# Patient Record
Sex: MALE | Race: WHITE | NOT HISPANIC OR LATINO | Employment: OTHER | URBAN - METROPOLITAN AREA
[De-identification: names, ages, dates, MRNs, and addresses within clinical notes are randomized per-mention and may not be internally consistent; named-entity substitution may affect disease eponyms.]

---

## 2017-02-13 ENCOUNTER — OFFICE VISIT (OUTPATIENT)
Dept: RETAIL CLINIC | Facility: CLINIC | Age: 57
End: 2017-02-13

## 2017-02-13 DIAGNOSIS — Z11.1 VISIT FOR TB SKIN TEST: Primary | ICD-10-CM

## 2017-02-13 PROCEDURE — 86580 TB INTRADERMAL TEST: CPT | Performed by: NURSE PRACTITIONER

## 2017-02-14 NOTE — PROGRESS NOTES
CC:Presents for Tb screening.     S: Has never had a positive test for Tb or been infected with Tb.  Denies symptoms of active Tb and risk factors for acquiring latent or active Tb:  Has not had a cough> 3 weeks, hemoptysis, unexplained fever, unexplained weight loss, fatigue, night sweats, or change in appetite.  s not a high risk contact of person known or suspected of having Tb.  Has not been to another country for 3 or more months where Tb is common.  Has been in the [place] for > 5 years  Is not a resident or employee of high Tb risk congregate setting.  Is not a health care worker who serves high-risk patients.  Is not medically underserved.  Has not been homeless in past 2 years.  Does not inject illicit drugs or use crack cocaine.  Is not HIV positive, or considered at risk for HIV if status is unknown.   Is not imunosuppressed or on immunosuppressive therapy.  Is not malnourished or >10% below ideal body weight.    O: Appears well today. Respirations are even & unlabored. Lungs are CTA bilaterally.    A: ppd given today as directed. Pt to return to clinic in 48-72 hours for reading. Tolerated well.     P: [place] Department for Public Health Report of Health Tuberculosis Screening form completed and provided to patient. See scanned copy.     SABRINA Yanes

## 2017-02-14 NOTE — PATIENT INSTRUCTIONS
Tuberculin Skin Test  WHY AM I HAVING THIS TEST?  Tuberculosis (TB) is a bacterial infection caused by Mycobacterium tuberculosis. Most people who are exposed to these bacteria have a strong enough defense (immune) system to prevent the bacteria from causing TB and developing symptoms. Their bodies prevent the germs from being active and making them sick (latent TB infection).   However, if you have TB germs in your body and your immune system is weak, you can develop a TB infection. This can cause symptoms such as:   · Night sweats.  · Fever.  · Weakness.  · Weight loss.  A latent TB infection can also become active later in life if your immune system becomes weakened or compromised.  You may have this test if your health care provider suspects that you have TB. You may also have this test to screen for TB if you are at risk for getting the disease. Those at increased risk include:  · People who inject illegal drugs or share needles.  · People with HIV or other diseases that affect immunity.  · Health care workers.  · People who live in high-risk communities, such as homeless shelters, nursing homes, and correctional facilities.  · People who have been in contact with someone with TB.  · People from countries where TB is more common.  If you are in a high-risk group, your health care provider may wish to screen for TB more often. This can help prevent the spread of the disease. Sometimes TB screening is required when starting a new job, such as becoming a health care worker or a teacher. Colleges or universities may require it of new students.  HOW WILL I BE TESTED?  A tuberculin skin test is the main test used to check for exposure to the bacteria that can cause TB. The test checks for antibodies to the bacteria. Antibodies are proteins that your body produces to protect you from germs and other things that can make you sick.  Your health care provider will inject a solution known as PPD (purified protein  derivative) under the first layer of skin on your arm. This causes a blister-like bubble to form at the site. Your health care provider will then examine the site after a number of hours have passed to see if a reaction has occurred.  HOW DO I PREPARE FOR THE TEST?  There is no preparation required for this test.  WHAT DO THE RESULTS MEAN?  Your test results will be reported as either negative or positive.   If the tuberculin skin test produces a negative result, it is likely that you do not have TB and have not been exposed to the TB bacteria.  If you or your health care provider suspects exposure, however, you may want to repeat the test a few weeks later. A blood test may also be used to check for TB. This is because you will not react to the tuberculin skin test until several weeks after exposure to TB bacteria.  If you test positive to the tuberculin skin test, it is likely that you have been exposed to TB bacteria. The test does not distinguish between an active and a latent TB infection.  A false-positive result can occur. A false-positive result for TB bacteria is incorrect because it indicates a condition or finding is present when it is not.  Talk to your health care provider to discuss your results, treatment options, and if necessary, the need for more tests.  It is your responsibility to obtain your test results. Ask the lab or department performing the test when and how you will get your results. Talk with your health care provider if you have any questions about your results.     This information is not intended to replace advice given to you by your health care provider. Make sure you discuss any questions you have with your health care provider.     Document Released: 09/27/2006 Document Revised: 01/08/2016 Document Reviewed: 04/13/2015  [employer] Interactive Patient Education ©2016 [employer]

## 2018-05-22 ENCOUNTER — TRANSCRIBE ORDERS (OUTPATIENT)
Dept: ADMINISTRATIVE | Facility: HOSPITAL | Age: 58
End: 2018-05-22

## 2018-05-22 ENCOUNTER — HOSPITAL ENCOUNTER (OUTPATIENT)
Dept: GENERAL RADIOLOGY | Facility: HOSPITAL | Age: 58
Discharge: HOME | End: 2018-05-22
Admitting: NURSE PRACTITIONER

## 2018-05-22 DIAGNOSIS — Z01.811 PRE-OP CHEST EXAM: Primary | ICD-10-CM

## 2018-05-22 PROCEDURE — 71046 X-RAY EXAM CHEST 2 VIEWS: CPT

## 2019-11-11 ENCOUNTER — TRANSCRIBE ORDERS (OUTPATIENT)
Dept: ADMINISTRATIVE | Facility: HOSPITAL | Age: 59
End: 2019-11-11

## 2019-11-11 DIAGNOSIS — R07.9 CHEST PAIN, UNSPECIFIED TYPE: Primary | ICD-10-CM

## 2019-12-05 ENCOUNTER — HOSPITAL ENCOUNTER (OUTPATIENT)
Dept: CARDIOLOGY | Facility: HOSPITAL | Age: 59
Discharge: HOME | End: 2019-12-05

## 2019-12-05 VITALS — WEIGHT: 250 LBS | HEIGHT: 69 IN | BODY MASS INDEX: 37.03 KG/M2

## 2019-12-05 DIAGNOSIS — R07.9 CHEST PAIN, UNSPECIFIED TYPE: ICD-10-CM

## 2019-12-05 LAB
BH CV STRESS BP STAGE 2: NORMAL
BH CV STRESS BP STAGE 4: NORMAL
BH CV STRESS COMMENTS STAGE 1: NORMAL
BH CV STRESS DOSE REGADENOSON STAGE 1: 0.4
BH CV STRESS DURATION MIN STAGE 1: 1
BH CV STRESS DURATION MIN STAGE 2: 1
BH CV STRESS DURATION MIN STAGE 3: 1
BH CV STRESS DURATION MIN STAGE 4: 1
BH CV STRESS DURATION SEC STAGE 1: 0
BH CV STRESS DURATION SEC STAGE 2: 0
BH CV STRESS DURATION SEC STAGE 3: 0
BH CV STRESS DURATION SEC STAGE 4: 0
BH CV STRESS HR STAGE 1: 89
BH CV STRESS HR STAGE 2: 104
BH CV STRESS HR STAGE 3: 94
BH CV STRESS HR STAGE 4: 90
BH CV STRESS PROTOCOL 1: NORMAL
BH CV STRESS RECOVERY BP: NORMAL MMHG
BH CV STRESS RECOVERY HR: 90 BPM
BH CV STRESS STAGE 1: 1
BH CV STRESS STAGE 2: 2
BH CV STRESS STAGE 3: 3
BH CV STRESS STAGE 4: 4
LV EF NUC BP: 52 %
MAXIMAL PREDICTED HEART RATE: 163 BPM
PERCENT MAX PREDICTED HR: 66.87 %
STRESS BASELINE BP: NORMAL MMHG
STRESS BASELINE HR: 77 BPM
STRESS PERCENT HR: 79 %
STRESS POST PEAK BP: NORMAL MMHG
STRESS POST PEAK HR: 109 BPM
STRESS TARGET HR: 139 BPM

## 2019-12-05 PROCEDURE — 78452 HT MUSCLE IMAGE SPECT MULT: CPT | Performed by: INTERNAL MEDICINE

## 2019-12-05 PROCEDURE — 25010000002 REGADENOSON 0.4 MG/5ML SOLUTION: Performed by: NURSE PRACTITIONER

## 2019-12-05 PROCEDURE — 0 TECHNETIUM SESTAMIBI: Performed by: NURSE PRACTITIONER

## 2019-12-05 PROCEDURE — 78452 HT MUSCLE IMAGE SPECT MULT: CPT

## 2019-12-05 PROCEDURE — A9500 TC99M SESTAMIBI: HCPCS | Performed by: NURSE PRACTITIONER

## 2019-12-05 PROCEDURE — 93017 CV STRESS TEST TRACING ONLY: CPT

## 2019-12-05 PROCEDURE — 93018 CV STRESS TEST I&R ONLY: CPT | Performed by: INTERNAL MEDICINE

## 2019-12-05 RX ORDER — FEXOFENADINE HCL 180 MG/1
180 TABLET ORAL DAILY
COMMUNITY

## 2019-12-05 RX ORDER — ATORVASTATIN CALCIUM 20 MG/1
20 TABLET, FILM COATED ORAL DAILY
COMMUNITY

## 2019-12-05 RX ORDER — GUAIFENESIN 600 MG/1
1200 TABLET, EXTENDED RELEASE ORAL 2 TIMES DAILY PRN
COMMUNITY

## 2019-12-05 RX ORDER — HYDROCODONE BITARTRATE AND ACETAMINOPHEN 10; 325 MG/1; MG/1
1 TABLET ORAL 3 TIMES DAILY
COMMUNITY

## 2019-12-05 RX ADMIN — TECHNETIUM TC 99M SESTAMIBI 1 DOSE: 1 INJECTION INTRAVENOUS at 07:25

## 2019-12-05 RX ADMIN — REGADENOSON 0.4 MG: 0.08 INJECTION, SOLUTION INTRAVENOUS at 09:02

## 2019-12-05 RX ADMIN — TECHNETIUM TC 99M SESTAMIBI 1 DOSE: 1 INJECTION INTRAVENOUS at 09:00

## 2019-12-16 ENCOUNTER — CONSULT (OUTPATIENT)
Dept: CARDIOLOGY | Facility: CLINIC | Age: 59
End: 2019-12-16

## 2019-12-16 VITALS
HEIGHT: 69 IN | BODY MASS INDEX: 38.06 KG/M2 | OXYGEN SATURATION: 96 % | SYSTOLIC BLOOD PRESSURE: 150 MMHG | HEART RATE: 91 BPM | DIASTOLIC BLOOD PRESSURE: 88 MMHG | WEIGHT: 257 LBS

## 2019-12-16 DIAGNOSIS — R07.2 PRECORDIAL PAIN: Primary | ICD-10-CM

## 2019-12-16 PROCEDURE — 99203 OFFICE O/P NEW LOW 30 MIN: CPT | Performed by: INTERNAL MEDICINE

## 2019-12-16 NOTE — PROGRESS NOTES
[facility]  Consultation H&P  Adarsh Bowers  1960    There is no work phone number on file..    VISIT DATE:  12/16/2019    PCP: Srinivasan Richardson, APRN  [address] [facility] [address]  [place] [address]    CC:  Chief Complaint   Patient presents with   • Chest Pain     Consult      Previous cardiac studies and procedures:  December 2019 myocardial perfusion imaging  · Left ventricular ejection fraction is borderline normal (Calculated EF = 52%).  · Myocardial perfusion imaging indicates a normal myocardial perfusion study with no evidence of ischemia.  · Impressions are consistent with a low risk study.    ASSESSMENT:   Diagnosis Plan   1. Precordial pain         PLAN:  Status post low risk cardiac evaluation with no recurrent of atypical chest pain.  No further cardiac evaluation or medication titration recommend at this time.  His diabetes appears reasonably well controlled, he does have mild to moderate hypertriglyceridemia and a low HDL.  Stressed importance of regular exercise and a heart healthy/diabetic diet.  Goal blood pressure less than 130/80 mmHg.  Patient will trend home blood pressures with ongoing lifestyle changes.  Consider ACE/ARB if he remains above goal.    History of Present Illness   59-year-old diabetic gentleman with an episode of chest discomfort.  Reported sudden onset precordial left-sided dull chest discomfort which he describes as a muscle ache type sensation.  Onset at rest.  No obvious triggers.  Mild to moderate intensity, waxed and waned for about an hour.  Evaluation in the emergency department was unremarkable with negative troponins, unremarkable chest imaging and normal twelve-lead EKG.  He underwent a regadenoson myocardial perfusion imaging which revealed a normal LV systolic function and no evidence of obstructive coronary disease, no significant calcification noted in the coronary arteries or aorta.  Most recent hemoglobin A1c 6.5.  Reviewed most  "recent fasting lipid panel.  He reports that blood pressures will often run in the 140/85-88 mmHg range.  He is compliant with medical therapy.  Non-smoker.    PHYSICAL EXAMINATION:  Vitals:    12/16/19 0838   BP: 150/88   BP Location: Left arm   Patient Position: Sitting   Pulse: 91   SpO2: 96%   Weight: 117 kg (257 lb)   Height: 175.3 cm (69\")     General Appearance:    Alert, cooperative, no distress, appears stated age   Head:    Normocephalic, without obvious abnormality, atraumatic   Eyes:    conjunctiva/corneas clear, EOM's intact, fundi     benign, both eyes   Ears:    Normal TM's and external ear canals, both ears   Nose:   Nares normal, septum midline, mucosa normal, no drainage    or sinus tenderness   Throat:   Lips, mucosa, and tongue normal; teeth and gums normal   Neck:   Supple, symmetrical, trachea midline, no adenopathy;     thyroid:  no enlargement/tenderness/nodules; no carotid    bruit or JVD   Back:     Symmetric, no curvature, ROM normal, no CVA tenderness   Lungs:     Clear to auscultation bilaterally, respirations unlabored   Chest Wall:    No tenderness or deformity    Heart:    Regular rate and rhythm, S1 and S2 normal, no murmur, rub   or gallop, normal carotid impulse bilaterally without bruit.   Abdomen:     Soft, non-tender, bowel sounds active all four quadrants,     no masses, no organomegaly   Extremities:   Extremities normal, atraumatic, no cyanosis or edema   Pulses:   2+ and symmetric all extremities   Skin:   Skin color, texture, turgor normal, no rashes or lesions   Lymph nodes:   Cervical, supraclavicular, and axillary nodes normal   Neurologic:   normal strength, sensation intact     throughout       Diagnostic Data:  Procedures  No results found for: CHLPL, TRIG, HDL, LDLDIRECT  Lab Results   Component Value Date    GLUCOSE 84 06/21/2016    BUN 11 06/21/2016    CREATININE 0.80 06/21/2016     06/21/2016    K 3.7 06/21/2016     06/21/2016    CO2 21.0 06/21/2016 "     Lab Results   Component Value Date    HGBA1C 6.5 (H) 03/10/2014     Lab Results   Component Value Date    WBC 9.08 06/21/2016    HGB 14.0 06/21/2016    HCT 41.3 06/21/2016     06/21/2016       PROBLEM LIST:  Patient Active Problem List   Diagnosis   • Precordial pain       PAST MEDICAL HX  Past Medical History:   Diagnosis Date   • Arthritis    • Asthma    • Diabetes mellitus (CMS/HCC)    • Hyperlipidemia    • Hypertension    • Phlebitis        Allergies  Allergies   Allergen Reactions   • Morphine Nausea And Vomiting   • Penicillins Itching       Current Medications    Current Outpatient Medications:   •  amLODIPine 10 MG tablet 10 mg, valsartan 160 MG tablet 320 mg, Take 1 dose by mouth., Disp: , Rfl:   •  atorvastatin (LIPITOR) 20 MG tablet, Take 20 mg by mouth Daily., Disp: , Rfl:   •  fexofenadine (ALLEGRA) 180 MG tablet, Take 180 mg by mouth Daily., Disp: , Rfl:   •  guaiFENesin (MUCINEX) 600 MG 12 hr tablet, Take 1,200 mg by mouth 2 (Two) Times a Day., Disp: , Rfl:   •  HYDROcodone-acetaminophen (NORCO)  MG per tablet, Take 1 tablet by mouth 3 (Three) Times a Day., Disp: , Rfl:   •  HYDROXYCHLOROQUINE SULFATE PO, Take 200 mg by mouth 2 (Two) Times a Day., Disp: , Rfl:   •  metFORMIN (GLUCOPHAGE) 500 MG tablet, Take 500 mg by mouth Daily., Disp: , Rfl:          ROS  Review of Systems   Constitution: Positive for malaise/fatigue and weight gain.   Cardiovascular: Positive for chest pain.   Musculoskeletal: Positive for arthritis.       All other body systems reviewed and are negative    SOCIAL HX  Social History     Socioeconomic History   • Marital status:      Spouse name: Not on file   • Number of children: Not on file   • Years of education: Not on file   • Highest education level: Not on file   Tobacco Use   • Smoking status: Never Smoker   • Smokeless tobacco: Never Used   Substance and Sexual Activity   • Alcohol use: Yes     Comment: rarely   • Drug use: No   • Sexual activity:  Defer       FAMILY HX  Family History   Problem Relation Age of Onset   • Heart attack Father    • Heart disease Father    • Hypertension Mother    • Fibromyalgia Mother    • Heart disease Brother    • Hypertension Brother    • Other Brother         Garvis    • No Known Problems Brother              Marlon Zavala III, MD, FACC

## 2020-06-22 ENCOUNTER — TRANSCRIBE ORDERS (OUTPATIENT)
Dept: ADMINISTRATIVE | Facility: HOSPITAL | Age: 60
End: 2020-06-22

## 2020-06-22 ENCOUNTER — HOSPITAL ENCOUNTER (OUTPATIENT)
Dept: GENERAL RADIOLOGY | Facility: HOSPITAL | Age: 60
Discharge: HOME | End: 2020-06-22
Admitting: NURSE PRACTITIONER

## 2020-06-22 DIAGNOSIS — M25.551 RIGHT HIP PAIN: Primary | ICD-10-CM

## 2020-06-22 PROCEDURE — 73502 X-RAY EXAM HIP UNI 2-3 VIEWS: CPT

## 2020-12-02 ENCOUNTER — TRANSCRIBE ORDERS (OUTPATIENT)
Dept: ADMINISTRATIVE | Facility: HOSPITAL | Age: 60
End: 2020-12-02

## 2020-12-02 ENCOUNTER — HOSPITAL ENCOUNTER (OUTPATIENT)
Dept: GENERAL RADIOLOGY | Facility: HOSPITAL | Age: 60
Discharge: HOME | End: 2020-12-02
Admitting: NURSE PRACTITIONER

## 2020-12-02 DIAGNOSIS — M10.9 GOUT OF RIGHT HAND, UNSPECIFIED CAUSE, UNSPECIFIED CHRONICITY: Primary | ICD-10-CM

## 2020-12-02 PROCEDURE — 73130 X-RAY EXAM OF HAND: CPT

## 2020-12-02 PROCEDURE — 73110 X-RAY EXAM OF WRIST: CPT

## 2020-12-23 ENCOUNTER — TRANSCRIBE ORDERS (OUTPATIENT)
Dept: ADMINISTRATIVE | Facility: HOSPITAL | Age: 60
End: 2020-12-23

## 2020-12-23 DIAGNOSIS — I10 MALIGNANT HYPERTENSION: Primary | ICD-10-CM

## 2021-01-25 ENCOUNTER — APPOINTMENT (OUTPATIENT)
Dept: CARDIOLOGY | Facility: HOSPITAL | Age: 61
End: 2021-01-25

## 2022-01-31 ENCOUNTER — OFFICE VISIT (OUTPATIENT)
Dept: CARDIOLOGY | Facility: CLINIC | Age: 62
End: 2022-01-31

## 2022-01-31 VITALS
HEART RATE: 91 BPM | SYSTOLIC BLOOD PRESSURE: 180 MMHG | DIASTOLIC BLOOD PRESSURE: 104 MMHG | HEIGHT: 70 IN | WEIGHT: 251 LBS | OXYGEN SATURATION: 95 % | BODY MASS INDEX: 35.93 KG/M2

## 2022-01-31 DIAGNOSIS — I10 PRIMARY HYPERTENSION: Primary | ICD-10-CM

## 2022-01-31 PROCEDURE — 93000 ELECTROCARDIOGRAM COMPLETE: CPT | Performed by: INTERNAL MEDICINE

## 2022-01-31 PROCEDURE — 99213 OFFICE O/P EST LOW 20 MIN: CPT | Performed by: INTERNAL MEDICINE

## 2022-01-31 RX ORDER — ALLOPURINOL 300 MG/1
300 TABLET ORAL DAILY
COMMUNITY
Start: 2021-12-07

## 2022-01-31 RX ORDER — SPIRONOLACTONE 25 MG/1
25 TABLET ORAL DAILY
Qty: 30 TABLET | Refills: 11 | Status: SHIPPED | OUTPATIENT
Start: 2022-01-31 | End: 2022-03-16 | Stop reason: SDUPTHER

## 2022-01-31 RX ORDER — DILTIAZEM HYDROCHLORIDE 180 MG/1
180 CAPSULE, COATED, EXTENDED RELEASE ORAL DAILY
COMMUNITY
End: 2022-11-14

## 2022-01-31 RX ORDER — VALSARTAN AND HYDROCHLOROTHIAZIDE 320; 25 MG/1; MG/1
1 TABLET, FILM COATED ORAL DAILY
COMMUNITY
Start: 2021-11-26

## 2022-01-31 RX ORDER — CLONIDINE 0.3 MG/24H
PATCH, EXTENDED RELEASE TRANSDERMAL WEEKLY
COMMUNITY
Start: 2021-12-15 | End: 2022-03-16 | Stop reason: SDUPTHER

## 2022-01-31 NOTE — PROGRESS NOTES
[facility]  Office visit  Adarsh Bowers  1960  [phone]  There is no work phone number on file.    VISIT DATE:  1/31/2022    PCP: Srinivasan Richardson, APRN  [address] [facility] [address]  [facility] [address]    CC:  Chief Complaint   Patient presents with   • Hypertension       Previous cardiac studies and procedures:  December 2019 myocardial perfusion imaging  · Left ventricular ejection fraction is borderline normal (Calculated EF = 52%).  · Myocardial perfusion imaging indicates a normal myocardial perfusion study with no evidence of ischemia.  · Impressions are consistent with a low risk study.    ASSESSMENT:   Diagnosis Plan   1. Primary hypertension  Basic Metabolic Panel       PLAN:  Hypertension, essential: Goal less than 130/80 mmHg.  Currently no other suspicion for secondary etiology other than obesity.  Adding spironolactone 25 mg p.o. daily.  Repeat BMP in 2 weeks.  Otherwise continue current medical therapy.  Encouraged dietary modification and regular exercise in order to achieve weight loss.  Currently no clinical symptoms concerning for ANDRES.  If further afterload control still required we will add Cardura at bedtime.    Subjective  Normal assessment: Has had a gradually worsening his underlying blood pressure control of the previous 6 to 12 months.  Systolic blood pressures are often running in the 140 to 159 mmHg range.  Denies chest pain, palpitations, dyspnea on exertion, headache, visual changes.  Compliant with medical therapy.  Recent medical changes include switching amlodipine to diltiazem.      Initial evaluation: 59-year-old diabetic gentleman with an episode of chest discomfort.  Reported sudden onset precordial left-sided dull chest discomfort which he describes as a muscle ache type sensation.  Onset at rest.  No obvious triggers.  Mild to moderate intensity, waxed and waned for about an hour.  Evaluation in the emergency department was  "unremarkable with negative troponins, unremarkable chest imaging and normal twelve-lead EKG.  He underwent a regadenoson myocardial perfusion imaging which revealed a normal LV systolic function and no evidence of obstructive coronary disease, no significant calcification noted in the coronary arteries or aorta.  Most recent hemoglobin A1c 6.5.  Reviewed most recent fasting lipid panel.  He reports that blood pressures will often run in the 140/85-88 mmHg range.  He is compliant with medical therapy.  Non-smoker.    PHYSICAL EXAMINATION:  Vitals:    01/31/22 0822   BP: (!) 180/104   BP Location: Left arm   Patient Position: Sitting   Pulse: 91   SpO2: 95%   Weight: 114 kg (251 lb)   Height: 176.5 cm (69.5\")     General Appearance:    Alert, cooperative, no distress, appears stated age   Head:    Normocephalic, without obvious abnormality, atraumatic   Eyes:    conjunctiva/corneas clear   Nose:   Nares normal, septum midline, mucosa normal, no drainage   Throat:   Lips, teeth and gums normal   Neck:   Supple, symmetrical, trachea midline, no carotid    bruit or JVD   Lungs:     Clear to auscultation bilaterally, respirations unlabored   Chest Wall:    No tenderness or deformity    Heart:    Regular rate and rhythm, S1 and S2 normal, no murmur, rub   or gallop, normal carotid impulse bilaterally without bruit.   Abdomen:     Soft, non-tender   Extremities:   Extremities normal, atraumatic, no cyanosis or edema   Pulses:   2+ and symmetric all extremities   Skin:   Skin color, texture, turgor normal, no rashes or lesions       Diagnostic Data:    ECG 12 Lead    Date/Time: 1/31/2022 8:44 AM  Performed by: Marlon Zavala III, MD  Authorized by: Marlon Zavala III, MD   Comparison: compared with previous ECG from 9/17/2014  Similar to previous ECG  Rhythm: sinus rhythm    Clinical impression: normal ECG          No results found for: CHLPL, TRIG, HDL, LDLDIRECT  Lab Results   Component Value Date    GLUCOSE 84 06/21/2016    " BUN 11 06/21/2016    CREATININE 0.80 06/21/2016     06/21/2016    K 3.7 06/21/2016     06/21/2016    CO2 21.0 06/21/2016     Lab Results   Component Value Date    HGBA1C 6.5 (H) 03/10/2014     Lab Results   Component Value Date    WBC 9.08 06/21/2016    HGB 14.0 06/21/2016    HCT 41.3 06/21/2016     06/21/2016       Allergies  Allergies   Allergen Reactions   • Morphine Nausea And Vomiting   • Penicillins Itching       Current Medications    Current Outpatient Medications:   •  allopurinol (ZYLOPRIM) 300 MG tablet, Take 300 mg by mouth Daily., Disp: , Rfl:   •  atorvastatin (LIPITOR) 20 MG tablet, Take 20 mg by mouth Daily., Disp: , Rfl:   •  cloNIDine (CATAPRES-TTS) 0.3 MG/24HR patch, 1 (One) Time Per Week., Disp: , Rfl:   •  dilTIAZem CD (CARDIZEM CD) 180 MG 24 hr capsule, Take 180 mg by mouth Daily., Disp: , Rfl:   •  fexofenadine (ALLEGRA) 180 MG tablet, Take 180 mg by mouth Daily., Disp: , Rfl:   •  guaiFENesin (MUCINEX) 600 MG 12 hr tablet, Take 1,200 mg by mouth 2 (Two) Times a Day., Disp: , Rfl:   •  HYDROcodone-acetaminophen (NORCO)  MG per tablet, Take 1 tablet by mouth 3 (Three) Times a Day., Disp: , Rfl:   •  HYDROXYCHLOROQUINE SULFATE PO, Take 200 mg by mouth 2 (Two) Times a Day., Disp: , Rfl:   •  metFORMIN (GLUCOPHAGE) 500 MG tablet, Take 500 mg by mouth Daily., Disp: , Rfl:   •  valsartan-hydrochlorothiazide (DIOVAN-HCT) 320-25 MG per tablet, Take 1 tablet by mouth Daily., Disp: , Rfl:   •  spironolactone (ALDACTONE) 25 MG tablet, Take 1 tablet by mouth Daily., Disp: 30 tablet, Rfl: 11          ROS  ROS      SOCIAL HX  Social History     Socioeconomic History   • Marital status:    Tobacco Use   • Smoking status: Never Smoker   • Smokeless tobacco: Never Used   Substance and Sexual Activity   • Alcohol use: Yes     Comment: rarely   • Drug use: No   • Sexual activity: Defer       FAMILY HX  Family History   Problem Relation Age of Onset   • Heart attack Father    •  Heart disease Father    • Hypertension Mother    • Fibromyalgia Mother    • Heart disease Brother    • Hypertension Brother    • Other Brother         Garvis    • No Known Problems Brother              Marlon Zavala III, MD, FACC

## 2022-02-15 ENCOUNTER — LAB (OUTPATIENT)
Dept: LAB | Facility: HOSPITAL | Age: 62
End: 2022-02-15

## 2022-02-15 DIAGNOSIS — I10 PRIMARY HYPERTENSION: ICD-10-CM

## 2022-02-15 PROCEDURE — 80048 BASIC METABOLIC PNL TOTAL CA: CPT

## 2022-02-15 PROCEDURE — 36415 COLL VENOUS BLD VENIPUNCTURE: CPT

## 2022-02-16 LAB
ANION GAP SERPL CALCULATED.3IONS-SCNC: 12.1 MMOL/L (ref 5–15)
BUN SERPL-MCNC: 33 MG/DL (ref 6–20)
BUN/CREAT SERPL: 30.6 (ref 7–25)
CALCIUM SPEC-SCNC: 10.1 MG/DL (ref 8.6–10.5)
CHLORIDE SERPL-SCNC: 97 MMOL/L (ref 98–107)
CO2 SERPL-SCNC: 25.9 MMOL/L (ref 22–29)
CREAT SERPL-MCNC: 1.08 MG/DL (ref 0.76–1.27)
GFR SERPL CREATININE-BSD FRML MDRD: 70 ML/MIN/1.73
GLUCOSE SERPL-MCNC: 105 MG/DL (ref 65–99)
POTASSIUM SERPL-SCNC: 4.6 MMOL/L (ref 3.5–5.2)
SODIUM SERPL-SCNC: 135 MMOL/L (ref 136–145)

## 2022-03-16 ENCOUNTER — OFFICE VISIT (OUTPATIENT)
Dept: CARDIOLOGY | Facility: CLINIC | Age: 62
End: 2022-03-16

## 2022-03-16 VITALS
OXYGEN SATURATION: 94 % | HEIGHT: 69 IN | DIASTOLIC BLOOD PRESSURE: 84 MMHG | BODY MASS INDEX: 34.98 KG/M2 | SYSTOLIC BLOOD PRESSURE: 160 MMHG | HEART RATE: 88 BPM | WEIGHT: 236.2 LBS

## 2022-03-16 DIAGNOSIS — I10 PRIMARY HYPERTENSION: Primary | ICD-10-CM

## 2022-03-16 PROCEDURE — 99213 OFFICE O/P EST LOW 20 MIN: CPT | Performed by: INTERNAL MEDICINE

## 2022-03-16 RX ORDER — CLONIDINE 0.3 MG/24H
1 PATCH, EXTENDED RELEASE TRANSDERMAL WEEKLY
Qty: 12 PATCH | Refills: 3 | Status: SHIPPED | OUTPATIENT
Start: 2022-03-16 | End: 2022-11-14 | Stop reason: SDUPTHER

## 2022-03-16 RX ORDER — SPIRONOLACTONE 25 MG/1
25 TABLET ORAL DAILY
Qty: 90 TABLET | Refills: 3 | Status: SHIPPED | OUTPATIENT
Start: 2022-03-16

## 2022-03-16 NOTE — PROGRESS NOTES
[facility]  Office visit  Adarsh Bowers  1960  [phone]  There is no work phone number on file.    VISIT DATE:  3/16/2022    PCP: Srinivasan Richardson, APRN  [address] [facility] [address]  [facility] [address]    CC:  Chief Complaint   Patient presents with   • Primary hypertension       Previous cardiac studies and procedures:  December 2019 myocardial perfusion imaging  · Left ventricular ejection fraction is borderline normal (Calculated EF = 52%).  · Myocardial perfusion imaging indicates a normal myocardial perfusion study with no evidence of ischemia.  · Impressions are consistent with a low risk study.    ASSESSMENT:   Diagnosis Plan   1. Primary hypertension         PLAN:  Hypertension, essential: Goal less than 130/80 mmHg.  Currently no other suspicion for secondary etiology other than obesity.  Improving control, continue current medical therapy.  Congratulated him on his ability to lose weight with dietary and lifestyle modifications.  Currently no clinical symptoms concerning for ANDRES.  If further afterload control still required we will add Cardura at bedtime.    Subjective  Interval assessment: Continues to lose weight with dietary and lifestyle modifications, down to 236 pounds, started 257 pounds.  Blood pressures now consistently running less than 135/80 mmHg.  He is compliant with medical therapy.  Denies chest pain, dyspnea or palpitations.    Initial evaluation: 59-year-old diabetic gentleman with an episode of chest discomfort.  Reported sudden onset precordial left-sided dull chest discomfort which he describes as a muscle ache type sensation.  Onset at rest.  No obvious triggers.  Mild to moderate intensity, waxed and waned for about an hour.  Evaluation in the emergency department was unremarkable with negative troponins, unremarkable chest imaging and normal twelve-lead EKG.  He underwent a regadenoson myocardial perfusion imaging which revealed a  "normal LV systolic function and no evidence of obstructive coronary disease, no significant calcification noted in the coronary arteries or aorta.  Most recent hemoglobin A1c 6.5.  Reviewed most recent fasting lipid panel.  He reports that blood pressures will often run in the 140/85-88 mmHg range.  He is compliant with medical therapy.  Non-smoker.    PHYSICAL EXAMINATION:  Vitals:    03/16/22 1105   BP: 160/84   BP Location: Left arm   Patient Position: Sitting   Pulse: 88   SpO2: 94%   Weight: 107 kg (236 lb 3.2 oz)   Height: 175.3 cm (69\")     General Appearance:    Alert, cooperative, no distress, appears stated age   Head:    Normocephalic, without obvious abnormality, atraumatic   Eyes:    conjunctiva/corneas clear   Nose:   Nares normal, septum midline, mucosa normal, no drainage   Throat:   Lips, teeth and gums normal   Neck:   Supple, symmetrical, trachea midline, no carotid    bruit or JVD   Lungs:     Clear to auscultation bilaterally, respirations unlabored   Chest Wall:    No tenderness or deformity    Heart:    Regular rate and rhythm, S1 and S2 normal, no murmur, rub   or gallop, normal carotid impulse bilaterally without bruit.   Abdomen:     Soft, non-tender   Extremities:   Extremities normal, atraumatic, no cyanosis or edema   Pulses:   2+ and symmetric all extremities   Skin:   Skin color, texture, turgor normal, no rashes or lesions       Diagnostic Data:  Procedures  No results found for: CHLPL, TRIG, HDL, LDLDIRECT  Lab Results   Component Value Date    GLUCOSE 105 (H) 02/15/2022    BUN 33 (H) 02/15/2022    CREATININE 1.08 02/15/2022     (L) 02/15/2022    K 4.6 02/15/2022    CL 97 (L) 02/15/2022    CO2 25.9 02/15/2022     Lab Results   Component Value Date    HGBA1C 6.5 (H) 03/10/2014     Lab Results   Component Value Date    WBC 9.08 06/21/2016    HGB 14.0 06/21/2016    HCT 41.3 06/21/2016     06/21/2016       Allergies  Allergies   Allergen Reactions   • Morphine Nausea And " Vomiting   • Penicillins Itching       Current Medications    Current Outpatient Medications:   •  allopurinol (ZYLOPRIM) 300 MG tablet, Take 300 mg by mouth Daily., Disp: , Rfl:   •  atorvastatin (LIPITOR) 20 MG tablet, Take 20 mg by mouth Daily., Disp: , Rfl:   •  cloNIDine (CATAPRES-TTS) 0.3 MG/24HR patch, Place 1 patch on the skin as directed by provider 1 (One) Time Per Week., Disp: 12 patch, Rfl: 3  •  dilTIAZem CD (CARDIZEM CD) 180 MG 24 hr capsule, Take 180 mg by mouth Daily., Disp: , Rfl:   •  fexofenadine (ALLEGRA) 180 MG tablet, Take 180 mg by mouth Daily., Disp: , Rfl:   •  guaiFENesin (MUCINEX) 600 MG 12 hr tablet, Take 1,200 mg by mouth 2 (Two) Times a Day As Needed., Disp: , Rfl:   •  HYDROcodone-acetaminophen (NORCO)  MG per tablet, Take 1 tablet by mouth 3 (Three) Times a Day., Disp: , Rfl:   •  HYDROXYCHLOROQUINE SULFATE PO, Take 200 mg by mouth 2 (Two) Times a Day., Disp: , Rfl:   •  metFORMIN (GLUCOPHAGE) 500 MG tablet, Take 500 mg by mouth Daily., Disp: , Rfl:   •  spironolactone (ALDACTONE) 25 MG tablet, Take 1 tablet by mouth Daily., Disp: 90 tablet, Rfl: 3  •  valsartan-hydrochlorothiazide (DIOVAN-HCT) 320-25 MG per tablet, Take 1 tablet by mouth Daily., Disp: , Rfl:           ROS  ROS      SOCIAL HX  Social History     Socioeconomic History   • Marital status:    Tobacco Use   • Smoking status: Never Smoker   • Smokeless tobacco: Never Used   Substance and Sexual Activity   • Alcohol use: Yes     Comment: rarely   • Drug use: No   • Sexual activity: Defer       FAMILY HX  Family History   Problem Relation Age of Onset   • Heart attack Father    • Heart disease Father    • Hypertension Mother    • Fibromyalgia Mother    • Heart disease Brother    • Hypertension Brother    • Other Brother         Garvis    • No Known Problems Brother              Marlon Zavala III, MD, [facility]

## 2022-11-14 ENCOUNTER — OFFICE VISIT (OUTPATIENT)
Dept: CARDIOLOGY | Facility: CLINIC | Age: 62
End: 2022-11-14

## 2022-11-14 VITALS
WEIGHT: 216 LBS | SYSTOLIC BLOOD PRESSURE: 182 MMHG | HEIGHT: 69 IN | DIASTOLIC BLOOD PRESSURE: 88 MMHG | BODY MASS INDEX: 31.99 KG/M2 | HEART RATE: 86 BPM | RESPIRATION RATE: 18 BRPM | OXYGEN SATURATION: 96 %

## 2022-11-14 DIAGNOSIS — I10 PRIMARY HYPERTENSION: Primary | ICD-10-CM

## 2022-11-14 PROCEDURE — 99213 OFFICE O/P EST LOW 20 MIN: CPT | Performed by: INTERNAL MEDICINE

## 2022-11-14 RX ORDER — ASPIRIN 81 MG/1
81 TABLET, CHEWABLE ORAL DAILY
COMMUNITY

## 2022-11-14 RX ORDER — CLONIDINE 0.3 MG/24H
1 PATCH, EXTENDED RELEASE TRANSDERMAL WEEKLY
Qty: 12 PATCH | Refills: 3 | Status: SHIPPED | OUTPATIENT
Start: 2022-11-14

## 2022-11-14 RX ORDER — DILTIAZEM HYDROCHLORIDE EXTENDED-RELEASE TABLETS 240 MG/1
240 TABLET, EXTENDED RELEASE ORAL DAILY
COMMUNITY

## 2022-11-14 RX ORDER — DOXAZOSIN MESYLATE 4 MG/1
4 TABLET ORAL NIGHTLY
Qty: 90 TABLET | Refills: 1 | Status: SHIPPED | OUTPATIENT
Start: 2022-11-14

## 2022-11-14 NOTE — PROGRESS NOTES
[facility] Cardiology  Office visit  Adarsh Bowers  1960  [phone]  There is no work phone number on file.    VISIT DATE:  11/14/2022    PCP: Srinivasan Richardson, APRN  [address] [address]    CC:  Chief Complaint   Patient presents with   • Hypertension       Previous cardiac studies and procedures:  December 2019 myocardial perfusion imaging  · Left ventricular ejection fraction is borderline normal (Calculated EF = 52%).  · Myocardial perfusion imaging indicates a normal myocardial perfusion study with no evidence of ischemia.  · Impressions are consistent with a low risk study.    ASSESSMENT:   Diagnosis Plan   1. Primary hypertension            PLAN:  Hypertension, essential: Goal less than 130/80 mmHg.  Currently no other suspicion for secondary etiology other than obesity. Currently no clinical symptoms concerning for ANDRES.  Adding Cardura 4 mg p.o. nightly.  Otherwise continue current medical therapy.      Subjective  Interval assessment: Continues to lose weight with dietary and lifestyle modifications, down to 216 pounds, started 257 pounds.  Systolic blood pressures running in the 140 to 145 mmHg range with diastolic blood pressures running 85 to 89 mmHg range.  He is compliant with medical therapy.  Denies chest pain, dyspnea or palpitations.    Initial evaluation: 59-year-old diabetic gentleman with an episode of chest discomfort.  Reported sudden onset precordial left-sided dull chest discomfort which he describes as a muscle ache type sensation.  Onset at rest.  No obvious triggers.  Mild to moderate intensity, waxed and waned for about an hour.  Evaluation in the emergency department was unremarkable with negative troponins, unremarkable chest imaging and normal twelve-lead EKG.  He underwent a regadenoson myocardial perfusion imaging which revealed a normal LV systolic function and no evidence of obstructive coronary disease, no  "significant calcification noted in the coronary arteries or aorta.  Most recent hemoglobin A1c 6.5.  Reviewed most recent fasting lipid panel.  He reports that blood pressures will often run in the 140/85-88 mmHg range.  He is compliant with medical therapy.  Non-smoker.    PHYSICAL EXAMINATION:  Vitals:    11/14/22 0948   BP: (!) 182/88   BP Location: Left arm   Patient Position: Sitting   Cuff Size: Adult   Pulse: 86   Resp: 18   SpO2: 96%   Weight: 98 kg (216 lb)   Height: 175.3 cm (69\")     General Appearance:    Alert, cooperative, no distress, appears stated age   Head:    Normocephalic, without obvious abnormality, atraumatic   Eyes:    conjunctiva/corneas clear   Nose:   Nares normal, septum midline, mucosa normal, no drainage   Throat:   Lips, teeth and gums normal   Neck:   Supple, symmetrical, trachea midline, no carotid    bruit or JVD   Lungs:     Clear to auscultation bilaterally, respirations unlabored   Chest Wall:    No tenderness or deformity    Heart:    Regular rate and rhythm, S1 and S2 normal, no murmur, rub   or gallop, normal carotid impulse bilaterally without bruit.   Abdomen:     Soft, non-tender   Extremities:   Extremities normal, atraumatic, no cyanosis or edema   Pulses:   2+ and symmetric all extremities   Skin:   Skin color, texture, turgor normal, no rashes or lesions       Diagnostic Data:  Procedures  No results found for: CHLPL, TRIG, HDL, LDLDIRECT  Lab Results   Component Value Date    GLUCOSE 105 (H) 02/15/2022    BUN 33 (H) 02/15/2022    CREATININE 1.08 02/15/2022     (L) 02/15/2022    K 4.6 02/15/2022    CL 97 (L) 02/15/2022    CO2 25.9 02/15/2022     Lab Results   Component Value Date    HGBA1C 6.5 (H) 03/10/2014     Lab Results   Component Value Date    WBC 9.08 06/21/2016    HGB 14.0 06/21/2016    HCT 41.3 06/21/2016     06/21/2016       Allergies  Allergies   Allergen Reactions   • Morphine Nausea And Vomiting   • Penicillins Itching       Current " Medications    Current Outpatient Medications:   •  allopurinol (ZYLOPRIM) 300 MG tablet, Take 300 mg by mouth Daily., Disp: , Rfl:   •  aspirin 81 MG chewable tablet, Chew 1 tablet Daily., Disp: , Rfl:   •  atorvastatin (LIPITOR) 20 MG tablet, Take 20 mg by mouth Daily., Disp: , Rfl:   •  cloNIDine (CATAPRES-TTS) 0.3 MG/24HR patch, Place 1 patch on the skin as directed by provider 1 (One) Time Per Week., Disp: 12 patch, Rfl: 3  •  dilTIAZem LA (Matzim LA) 240 MG 24 hr tablet, Take 1 tablet by mouth Daily., Disp: , Rfl:   •  fexofenadine (ALLEGRA) 180 MG tablet, Take 180 mg by mouth Daily., Disp: , Rfl:   •  guaiFENesin (MUCINEX) 600 MG 12 hr tablet, Take 1,200 mg by mouth 2 (Two) Times a Day As Needed., Disp: , Rfl:   •  HYDROcodone-acetaminophen (NORCO)  MG per tablet, Take 1 tablet by mouth 3 (Three) Times a Day., Disp: , Rfl:   •  HYDROXYCHLOROQUINE SULFATE PO, Take 200 mg by mouth 2 (Two) Times a Day., Disp: , Rfl:   •  metFORMIN (GLUCOPHAGE) 500 MG tablet, Take 500 mg by mouth Daily., Disp: , Rfl:   •  spironolactone (ALDACTONE) 25 MG tablet, Take 1 tablet by mouth Daily., Disp: 90 tablet, Rfl: 3  •  valsartan-hydrochlorothiazide (DIOVAN-HCT) 320-25 MG per tablet, Take 1 tablet by mouth Daily., Disp: , Rfl:   •  dilTIAZem CD (CARDIZEM CD) 180 MG 24 hr capsule, Take 180 mg by mouth Daily., Disp: , Rfl:           ROS  ROS      SOCIAL HX  Social History     Socioeconomic History   • Marital status:    Tobacco Use   • Smoking status: Never   • Smokeless tobacco: Never   Vaping Use   • Vaping Use: Never used   Substance and Sexual Activity   • Alcohol use: Not Currently     Alcohol/week: 1.0 standard drink     Types: 1 Cans of beer per week     Comment: rarely   • Drug use: No   • Sexual activity: Defer     Partners: Female     Birth control/protection: None, Same-sex partner     Comment: Wife has gone through the change but she had her tubes tied       FAMILY HX  Family History   Problem Relation Age  of Onset   • Heart attack Father    • Heart disease Father    • Hypertension Mother    • Fibromyalgia Mother    • Heart attack Mother    • Heart disease Brother    • Hypertension Brother    • Other Brother         Garvis    • No Known Problems Brother              Marlon Zavala III, MD, FACC

## 2023-04-24 RX ORDER — SPIRONOLACTONE 25 MG/1
25 TABLET ORAL DAILY
Qty: 90 TABLET | Refills: 0 | Status: SHIPPED | OUTPATIENT
Start: 2023-04-24

## 2023-05-09 RX ORDER — DOXAZOSIN MESYLATE 4 MG/1
4 TABLET ORAL NIGHTLY
Qty: 90 TABLET | Refills: 1 | Status: SHIPPED | OUTPATIENT
Start: 2023-05-09

## 2023-08-22 ENCOUNTER — OFFICE VISIT (OUTPATIENT)
Dept: CARDIOLOGY | Facility: CLINIC | Age: 63
End: 2023-08-22
Payer: COMMERCIAL

## 2023-08-22 VITALS
DIASTOLIC BLOOD PRESSURE: 74 MMHG | HEART RATE: 88 BPM | WEIGHT: 215 LBS | OXYGEN SATURATION: 95 % | BODY MASS INDEX: 31.84 KG/M2 | HEIGHT: 69 IN | SYSTOLIC BLOOD PRESSURE: 142 MMHG

## 2023-08-22 DIAGNOSIS — I10 PRIMARY HYPERTENSION: Primary | ICD-10-CM

## 2023-08-22 PROCEDURE — 99213 OFFICE O/P EST LOW 20 MIN: CPT | Performed by: INTERNAL MEDICINE

## 2023-08-22 RX ORDER — ALBUTEROL SULFATE 90 UG/1
2 AEROSOL, METERED RESPIRATORY (INHALATION) EVERY 4 HOURS PRN
COMMUNITY
Start: 2023-05-31

## 2023-08-22 NOTE — PROGRESS NOTES
[facility] Cardiology  Office visit  Adarsh Bowers  1960  [phone]  There is no work phone number on file.    VISIT DATE:  8/22/2023    PCP: Srinivasan Richardson, APRN  [address] [facility] [address]  [facility] [address]    CC:  Chief Complaint   Patient presents with    Primary hypertension       Previous cardiac studies and procedures:  December 2019 myocardial perfusion imaging  Left ventricular ejection fraction is borderline normal (Calculated EF = 52%).  Myocardial perfusion imaging indicates a normal myocardial perfusion study with no evidence of ischemia.  Impressions are consistent with a low risk study.    ASSESSMENT:   Diagnosis Plan   1. Primary hypertension            PLAN:  Hypertension, essential: Goal less than 130/80 mmHg.  Currently no other suspicion for secondary etiology other than obesity. Currently no clinical symptoms concerning for ANDRES.  continue current medical therapy.      Hyperlipidemia: Goal LDL less than 100.  Other than mildly depressed HDL he has an excellent lipid profile.  Continue current medical therapy.    Subjective  Interval assessment: Has been able to maintain his weight loss.  Systolic blood pressures running in the 125 to 145 mmHg range with diastolic blood pressures running 80-84 mmHg range.  He is compliant with medical therapy.  Denies chest pain, dyspnea or palpitations.  Adhering to a heart healthy, low carb diet.  He is exercising on a regular basis without difficulty.    Initial evaluation: 59-year-old diabetic gentleman with an episode of chest discomfort.  Reported sudden onset precordial left-sided dull chest discomfort which he describes as a muscle ache type sensation.  Onset at rest.  No obvious triggers.  Mild to moderate intensity, waxed and waned for about an hour.  Evaluation in the emergency department was unremarkable with negative troponins, unremarkable chest imaging and normal twelve-lead EKG.  He underwent a regadenoson  "myocardial perfusion imaging which revealed a normal LV systolic function and no evidence of obstructive coronary disease, no significant calcification noted in the coronary arteries or aorta.  Most recent hemoglobin A1c 6.5.  Reviewed most recent fasting lipid panel.  He reports that blood pressures will often run in the 140/85-88 mmHg range.  He is compliant with medical therapy.  Non-smoker.    PHYSICAL EXAMINATION:  Vitals:    08/22/23 1054   BP: 142/74   BP Location: Right arm   Patient Position: Sitting   Pulse: 88   SpO2: 95%   Weight: 97.5 kg (215 lb)   Height: 175.3 cm (69\")       General Appearance:    Alert, cooperative, no distress, appears stated age   Head:    Normocephalic, without obvious abnormality, atraumatic   Eyes:    conjunctiva/corneas clear   Nose:   Nares normal, septum midline, mucosa normal, no drainage   Throat:   Lips, teeth and gums normal   Neck:   Supple, symmetrical, trachea midline, no carotid    bruit or JVD   Lungs:     Clear to auscultation bilaterally, respirations unlabored   Chest Wall:    No tenderness or deformity    Heart:    Regular rate and rhythm, S1 and S2 normal, no murmur, rub   or gallop, normal carotid impulse bilaterally without bruit.   Abdomen:     Soft, non-tender   Extremities:   Extremities normal, atraumatic, no cyanosis or edema   Pulses:   2+ and symmetric all extremities   Skin:   Skin color, texture, turgor normal, no rashes or lesions       Diagnostic Data:  Procedures  No results found for: CHLPL, TRIG, HDL, LDLDIRECT  Lab Results   Component Value Date    GLUCOSE 105 (H) 02/15/2022    BUN 33 (H) 02/15/2022    CREATININE 1.08 02/15/2022     (L) 02/15/2022    K 4.6 02/15/2022    CL 97 (L) 02/15/2022    CO2 25.9 02/15/2022     Lab Results   Component Value Date    HGBA1C 6.5 (H) 03/10/2014     Lab Results   Component Value Date    WBC 9.08 06/21/2016    HGB 14.0 06/21/2016    HCT 41.3 06/21/2016     06/21/2016       Allergies  Allergies "   Allergen Reactions    Morphine Nausea And Vomiting    Penicillins Itching       Current Medications    Current Outpatient Medications:     albuterol sulfate  (90 Base) MCG/ACT inhaler, Inhale 2 puffs Every 4 (Four) Hours As Needed., Disp: , Rfl:     allopurinol (ZYLOPRIM) 300 MG tablet, Take 1 tablet by mouth Daily., Disp: , Rfl:     aspirin 81 MG chewable tablet, Chew 1 tablet Daily., Disp: , Rfl:     atorvastatin (LIPITOR) 20 MG tablet, Take 1 tablet by mouth Daily., Disp: , Rfl:     cloNIDine (CATAPRES-TTS) 0.3 MG/24HR patch, Place 1 patch on the skin as directed by provider 1 (One) Time Per Week. (Patient taking differently: Place  on the skin as directed by provider 1 (One) Time Per Week.), Disp: 12 patch, Rfl: 3    dilTIAZem LA (CARDIZEM LA) 240 MG 24 hr tablet, Take 1 tablet by mouth Daily., Disp: , Rfl:     doxazosin (Cardura) 4 MG tablet, Take 1 tablet by mouth Every Night., Disp: 90 tablet, Rfl: 1    fexofenadine (ALLEGRA) 180 MG tablet, Take 1 tablet by mouth Daily., Disp: , Rfl:     guaiFENesin (MUCINEX) 600 MG 12 hr tablet, Take 2 tablets by mouth 2 (Two) Times a Day As Needed., Disp: , Rfl:     HYDROcodone-acetaminophen (NORCO)  MG per tablet, Take 1 tablet by mouth 3 (Three) Times a Day., Disp: , Rfl:     HYDROXYCHLOROQUINE SULFATE PO, Take 200 mg by mouth 2 (Two) Times a Day., Disp: , Rfl:     metFORMIN (GLUCOPHAGE) 500 MG tablet, Take 1 tablet by mouth Daily., Disp: , Rfl:     spironolactone (ALDACTONE) 25 MG tablet, Take 1 tablet by mouth Daily. Need f/u appt for further refills., Disp: 90 tablet, Rfl: 0    valsartan-hydrochlorothiazide (DIOVAN-HCT) 320-25 MG per tablet, Take 1 tablet by mouth Daily., Disp: , Rfl:           ROS  ROS      SOCIAL HX  Social History     Socioeconomic History    Marital status:    Tobacco Use    Smoking status: Never    Smokeless tobacco: Never   Vaping Use    Vaping Use: Never used   Substance and Sexual Activity    Alcohol use: Not Currently      Alcohol/week: 1.0 standard drink     Types: 1 Cans of beer per week     Comment: rarely    Drug use: No    Sexual activity: Defer     Partners: Female     Birth control/protection: None, Same-sex partner     Comment: Wife has gone through the change but she had her tubes tied       FAMILY HX  Family History   Problem Relation Age of Onset    Heart attack Father     Heart disease Father     Hypertension Mother     Fibromyalgia Mother     Heart attack Mother     Heart disease Brother     Hypertension Brother     Other Brother         Garvis     No Known Problems Brother              Marlon Zavala III, MD, FACC

## 2023-09-27 RX ORDER — SPIRONOLACTONE 25 MG/1
25 TABLET ORAL DAILY
Qty: 90 TABLET | Refills: 1 | Status: SHIPPED | OUTPATIENT
Start: 2023-09-27

## 2023-09-27 NOTE — TELEPHONE ENCOUNTER
"    Caller: Adarsh Bowers \"Usmit\"    Relationship: Self    Best call back number: [phone]    Requested Prescriptions:   Requested Prescriptions     Pending Prescriptions Disp Refills    spironolactone (ALDACTONE) 25 MG tablet 90 tablet 0     Sig: Take 1 tablet by mouth Daily. Need f/u appt for further refills.        Pharmacy where request should be sent: [facility] [id number] [phone] PH - [phone]      Last office visit with prescribing clinician: 8/22/2023   Last telemedicine visit with prescribing clinician: Visit date not found   Next office visit with prescribing clinician: 8/26/2024     Additional details provided by patient:   Does the patient have less than a 3 day supply:  [x] Yes  [] No    Would you like a call back once the refill request has been completed: [x] Yes [] No    If the office needs to give you a call back, can they leave a voicemail: [x] Yes [] No    Mirna Shea Rep   09/27/23 10:58 EDT         "

## 2023-10-30 RX ORDER — CLONIDINE 0.3 MG/24H
1 PATCH, EXTENDED RELEASE TRANSDERMAL WEEKLY
Qty: 12 PATCH | Refills: 1 | Status: SHIPPED | OUTPATIENT
Start: 2023-10-30

## 2024-01-03 RX ORDER — DOXAZOSIN MESYLATE 4 MG/1
4 TABLET ORAL NIGHTLY
Qty: 90 TABLET | Refills: 1 | Status: SHIPPED | OUTPATIENT
Start: 2024-01-03

## 2024-04-01 RX ORDER — SPIRONOLACTONE 25 MG/1
25 TABLET ORAL DAILY
Qty: 90 TABLET | Refills: 0 | Status: SHIPPED | OUTPATIENT
Start: 2024-04-01

## 2024-04-15 RX ORDER — CLONIDINE 0.3 MG/24H
PATCH, EXTENDED RELEASE TRANSDERMAL
Qty: 12 PATCH | Refills: 1 | Status: SHIPPED | OUTPATIENT
Start: 2024-04-15

## 2024-06-26 RX ORDER — DOXAZOSIN MESYLATE 4 MG/1
4 TABLET ORAL NIGHTLY
Qty: 90 TABLET | Refills: 0 | Status: SHIPPED | OUTPATIENT
Start: 2024-06-26

## 2024-07-03 ENCOUNTER — TRANSCRIBE ORDERS (OUTPATIENT)
Dept: GENERAL RADIOLOGY | Facility: HOSPITAL | Age: 64
End: 2024-07-03
Payer: COMMERCIAL

## 2024-07-03 ENCOUNTER — HOSPITAL ENCOUNTER (OUTPATIENT)
Dept: GENERAL RADIOLOGY | Facility: HOSPITAL | Age: 64
Discharge: HOME | End: 2024-07-03
Admitting: NURSE PRACTITIONER
Payer: COMMERCIAL

## 2024-07-03 DIAGNOSIS — Z01.818 PRE-OP EXAMINATION: ICD-10-CM

## 2024-07-03 DIAGNOSIS — Z01.818 PRE-OP EXAMINATION: Primary | ICD-10-CM

## 2024-07-03 PROCEDURE — 71046 X-RAY EXAM CHEST 2 VIEWS: CPT

## 2024-07-03 RX ORDER — SPIRONOLACTONE 25 MG/1
25 TABLET ORAL DAILY
Qty: 90 TABLET | Refills: 0 | Status: SHIPPED | OUTPATIENT
Start: 2024-07-03

## 2024-08-19 ENCOUNTER — TELEPHONE (OUTPATIENT)
Dept: CARDIOLOGY | Facility: CLINIC | Age: 64
End: 2024-08-19

## 2024-08-26 ENCOUNTER — OFFICE VISIT (OUTPATIENT)
Dept: CARDIOLOGY | Facility: CLINIC | Age: 64
End: 2024-08-26
Payer: COMMERCIAL

## 2024-08-26 VITALS
HEIGHT: 69 IN | DIASTOLIC BLOOD PRESSURE: 78 MMHG | HEART RATE: 94 BPM | BODY MASS INDEX: 35.07 KG/M2 | SYSTOLIC BLOOD PRESSURE: 168 MMHG | WEIGHT: 236.8 LBS | OXYGEN SATURATION: 95 %

## 2024-08-26 DIAGNOSIS — I10 PRIMARY HYPERTENSION: Primary | ICD-10-CM

## 2024-08-26 DIAGNOSIS — R01.1 HEART MURMUR: ICD-10-CM

## 2024-08-26 PROCEDURE — 99214 OFFICE O/P EST MOD 30 MIN: CPT | Performed by: INTERNAL MEDICINE

## 2024-08-26 NOTE — PROGRESS NOTES
[facility] Cardiology  Office visit  Adarsh Bowers  1960  [phone]  There is no work phone number on file.    VISIT DATE:  8/26/2024    PCP: Srinivasan Richardson, APRN  [address] [facility] [address]  [facility] [address]    CC:  Chief Complaint   Patient presents with    Primary hypertension       Previous cardiac studies and procedures:  December 2019 myocardial perfusion imaging  Left ventricular ejection fraction is borderline normal (Calculated EF = 52%).  Myocardial perfusion imaging indicates a normal myocardial perfusion study with no evidence of ischemia.  Impressions are consistent with a low risk study.    ASSESSMENT:   Diagnosis Plan   1. Primary hypertension            PLAN:  Hypertension, essential: Goal less than 130/80 mmHg.  Currently no other suspicion for secondary etiology other than obesity. Currently no clinical symptoms concerning for ANDRES.  continue current medical therapy.      Hyperlipidemia: Goal LDL less than 100.  Other than mildly depressed HDL he has an excellent lipid profile.  Continue current medical therapy.    Murmur: Transthoracic echo pending to assess underlying myocardial structure and function.    Subjective  Interval assessment:  Systolic blood pressures running in the 125 to 140 mmHg range with diastolic blood pressures running 80-84 mmHg range.  He is compliant with medical therapy.  Denies chest pain, dyspnea or palpitations.  Adhering to a heart healthy, low carb diet.     Initial evaluation: 59-year-old diabetic gentleman with an episode of chest discomfort.  Reported sudden onset precordial left-sided dull chest discomfort which he describes as a muscle ache type sensation.  Onset at rest.  No obvious triggers.  Mild to moderate intensity, waxed and waned for about an hour.  Evaluation in the emergency department was unremarkable with negative troponins, unremarkable chest imaging and normal twelve-lead EKG.  He underwent a regadenoson  "myocardial perfusion imaging which revealed a normal LV systolic function and no evidence of obstructive coronary disease, no significant calcification noted in the coronary arteries or aorta.  Most recent hemoglobin A1c 6.5.  Reviewed most recent fasting lipid panel.  He reports that blood pressures will often run in the 140/85-88 mmHg range.  He is compliant with medical therapy.  Non-smoker.    PHYSICAL EXAMINATION:  Vitals:    08/26/24 0946   BP: 168/78   BP Location: Left arm   Patient Position: Sitting   Cuff Size: Adult   Pulse: 94   SpO2: 95%   Weight: 107 kg (236 lb 12.8 oz)   Height: 175.3 cm (69\")       General Appearance:    Alert, cooperative, no distress, appears stated age   Head:    Normocephalic, without obvious abnormality, atraumatic   Eyes:    conjunctiva/corneas clear   Nose:   Nares normal, septum midline, mucosa normal, no drainage   Throat:   Lips, teeth and gums normal   Neck:   Supple, symmetrical, trachea midline, no carotid    bruit or JVD   Lungs:     Clear to auscultation bilaterally, respirations unlabored   Chest Wall:    No tenderness or deformity    Heart:    Regular rate and rhythm, S1 and S2 normal, 2-3/6 early peaking systolic murmur right upper sternal border, no rub   or gallop, normal carotid impulse bilaterally without bruit.   Abdomen:     Soft, non-tender   Extremities:   Extremities normal, atraumatic, no cyanosis or edema   Pulses:   2+ and symmetric all extremities   Skin:   Skin color, texture, turgor normal, no rashes or lesions       Diagnostic Data:  Procedures  No results found for: \"CHLPL\", \"TRIG\", \"HDL\", \"LDLDIRECT\"  Lab Results   Component Value Date    GLUCOSE 105 (H) 02/15/2022    BUN 33 (H) 02/15/2022    CREATININE 1.08 02/15/2022     (L) 02/15/2022    K 4.6 02/15/2022    CL 97 (L) 02/15/2022    CO2 25.9 02/15/2022     Lab Results   Component Value Date    HGBA1C 6.5 (H) 03/10/2014     Lab Results   Component Value Date    WBC 9.08 06/21/2016    HGB 14.0 " 06/21/2016    HCT 41.3 06/21/2016     06/21/2016       Allergies  Allergies   Allergen Reactions    Morphine Nausea And Vomiting    Penicillins Itching       Current Medications    Current Outpatient Medications:     albuterol sulfate  (90 Base) MCG/ACT inhaler, Inhale 2 puffs Every 4 (Four) Hours As Needed., Disp: , Rfl:     allopurinol (ZYLOPRIM) 300 MG tablet, Take 1 tablet by mouth Daily., Disp: , Rfl:     aspirin 81 MG chewable tablet, Chew 1 tablet Daily., Disp: , Rfl:     atorvastatin (LIPITOR) 20 MG tablet, Take 1 tablet by mouth Daily., Disp: , Rfl:     cloNIDine (CATAPRES-TTS) 0.3 MG/24HR patch, APPLY 1 PATCH TO THE SKIN ONCE WEEKLY AS DIRECTED BY PROVIDER, Disp: 12 patch, Rfl: 1    dilTIAZem LA (CARDIZEM LA) 240 MG 24 hr tablet, Take 1 tablet by mouth Daily., Disp: , Rfl:     doxazosin (CARDURA) 4 MG tablet, TAKE ONE TABLET BY MOUTH ONCE NIGHTLY, Disp: 90 tablet, Rfl: 0    fexofenadine (ALLEGRA) 180 MG tablet, Take 1 tablet by mouth Daily., Disp: , Rfl:     guaiFENesin (MUCINEX) 600 MG 12 hr tablet, Take 2 tablets by mouth 2 (Two) Times a Day As Needed., Disp: , Rfl:     HYDROcodone-acetaminophen (NORCO)  MG per tablet, Take 1 tablet by mouth 3 (Three) Times a Day., Disp: , Rfl:     HYDROXYCHLOROQUINE SULFATE PO, Take 200 mg by mouth 2 (Two) Times a Day., Disp: , Rfl:     metFORMIN (GLUCOPHAGE) 500 MG tablet, Take 1 tablet by mouth Daily., Disp: , Rfl:     spironolactone (ALDACTONE) 25 MG tablet, Take 1 tablet by mouth Daily. Need lab work for further refills., Disp: 90 tablet, Rfl: 0    valsartan-hydrochlorothiazide (DIOVAN-HCT) 320-25 MG per tablet, Take 1 tablet by mouth Daily., Disp: , Rfl:           ROS  ROS      SOCIAL HX  Social History     Socioeconomic History    Marital status:    Tobacco Use    Smoking status: Never    Smokeless tobacco: Never   Vaping Use    Vaping status: Never Used   Substance and Sexual Activity    Alcohol use: Not Currently     Alcohol/week: 1.0  standard drink of alcohol     Types: 1 Cans of beer per week     Comment: rarely    Drug use: No    Sexual activity: Defer     Partners: Female     Birth control/protection: None, Partner of same sex     Comment: Wife has gone through the change but she had her tubes tied       FAMILY HX  Family History   Problem Relation Age of Onset    Heart attack Father     Heart disease Father     Hypertension Mother     Fibromyalgia Mother     Heart attack Mother     Heart disease Brother     Hypertension Brother     Other Brother         Garvis     No Known Problems Brother              Marlon Zavala III, MD, FACC

## 2024-09-12 ENCOUNTER — HOSPITAL ENCOUNTER (OUTPATIENT)
Dept: CARDIOLOGY | Facility: HOSPITAL | Age: 64
Discharge: HOME | End: 2024-09-12
Admitting: INTERNAL MEDICINE
Payer: COMMERCIAL

## 2024-09-12 DIAGNOSIS — R01.1 HEART MURMUR: ICD-10-CM

## 2024-09-12 PROCEDURE — 93306 TTE W/DOPPLER COMPLETE: CPT

## 2024-09-13 LAB
BH CV ECHO MEAS - AO MAX PG: 10.6 MMHG
BH CV ECHO MEAS - AO MEAN PG: 6 MMHG
BH CV ECHO MEAS - AO ROOT DIAM: 3.5 CM
BH CV ECHO MEAS - AO V2 MAX: 163 CM/SEC
BH CV ECHO MEAS - AO V2 VTI: 33.5 CM
BH CV ECHO MEAS - AVA(I,D): 2.35 CM2
BH CV ECHO MEAS - EDV(CUBED): 148.9 ML
BH CV ECHO MEAS - EF_3D-VOL: 57 %
BH CV ECHO MEAS - ESV(CUBED): 32.8 ML
BH CV ECHO MEAS - FS: 39.6 %
BH CV ECHO MEAS - IVS/LVPW: 0.9 CM
BH CV ECHO MEAS - IVSD: 0.9 CM
BH CV ECHO MEAS - LA DIMENSION: 3.3 CM
BH CV ECHO MEAS - LAT PEAK E' VEL: 13.9 CM/SEC
BH CV ECHO MEAS - LV MASS(C)D: 187.3 GRAMS
BH CV ECHO MEAS - LV MAX PG: 6.4 MMHG
BH CV ECHO MEAS - LV MEAN PG: 3 MMHG
BH CV ECHO MEAS - LV V1 MAX: 126 CM/SEC
BH CV ECHO MEAS - LV V1 VTI: 25.1 CM
BH CV ECHO MEAS - LVIDD: 5.3 CM
BH CV ECHO MEAS - LVIDS: 3.2 CM
BH CV ECHO MEAS - LVOT AREA: 3.1 CM2
BH CV ECHO MEAS - LVOT DIAM: 2 CM
BH CV ECHO MEAS - LVPWD: 1 CM
BH CV ECHO MEAS - MED PEAK E' VEL: 12.2 CM/SEC
BH CV ECHO MEAS - MV A MAX VEL: 81.7 CM/SEC
BH CV ECHO MEAS - MV DEC SLOPE: 551 CM/SEC2
BH CV ECHO MEAS - MV DEC TIME: 0.2 SEC
BH CV ECHO MEAS - MV E MAX VEL: 92 CM/SEC
BH CV ECHO MEAS - MV E/A: 1.13
BH CV ECHO MEAS - MV P1/2T: 56.9 MSEC
BH CV ECHO MEAS - MVA(P1/2T): 3.9 CM2
BH CV ECHO MEAS - PA ACC TIME: 0.1 SEC
BH CV ECHO MEAS - PA V2 MAX: 130 CM/SEC
BH CV ECHO MEAS - RAP SYSTOLE: 3 MMHG
BH CV ECHO MEAS - RVSP: 24 MMHG
BH CV ECHO MEAS - SV(LVOT): 78.7 ML
BH CV ECHO MEAS - SVI(LVOT): 35.5 ML/M2
BH CV ECHO MEAS - TAPSE (>1.6): 2.38 CM
BH CV ECHO MEAS - TR MAX PG: 21.3 MMHG
BH CV ECHO MEAS - TR MAX VEL: 231 CM/SEC
BH CV ECHO MEASUREMENTS AVERAGE E/E' RATIO: 7.05
BH CV XLRA - RV BASE: 4.2 CM
BH CV XLRA - RV LENGTH: 7.5 CM
BH CV XLRA - RV MID: 3.6 CM
BH CV XLRA - TDI S': 15.4 CM/SEC
LEFT ATRIUM VOLUME INDEX: 23 ML/M2

## 2024-09-16 ENCOUNTER — TELEPHONE (OUTPATIENT)
Dept: CARDIOLOGY | Facility: CLINIC | Age: 64
End: 2024-09-16
Payer: COMMERCIAL

## 2024-09-25 RX ORDER — DOXAZOSIN 4 MG/1
4 TABLET ORAL NIGHTLY
Qty: 90 TABLET | Refills: 1 | Status: SHIPPED | OUTPATIENT
Start: 2024-09-25

## 2024-10-01 RX ORDER — SPIRONOLACTONE 25 MG/1
25 TABLET ORAL DAILY
Qty: 90 TABLET | Refills: 1 | Status: SHIPPED | OUTPATIENT
Start: 2024-10-01

## 2024-10-01 RX ORDER — CLONIDINE 0.3 MG/24H
PATCH, EXTENDED RELEASE TRANSDERMAL
Qty: 12 PATCH | Refills: 1 | Status: SHIPPED | OUTPATIENT
Start: 2024-10-01

## 2025-04-02 RX ORDER — SPIRONOLACTONE 25 MG/1
25 TABLET ORAL DAILY
Qty: 90 TABLET | Refills: 0 | Status: SHIPPED | OUTPATIENT
Start: 2025-04-02

## 2025-04-18 RX ORDER — DOXAZOSIN 4 MG/1
4 TABLET ORAL NIGHTLY
Qty: 90 TABLET | Refills: 0 | Status: SHIPPED | OUTPATIENT
Start: 2025-04-18

## 2025-04-18 RX ORDER — DOXAZOSIN 4 MG/1
4 TABLET ORAL NIGHTLY
Qty: 90 TABLET | Refills: 0 | Status: SHIPPED | OUTPATIENT
Start: 2025-04-18 | End: 2025-04-18

## 2025-04-18 NOTE — TELEPHONE ENCOUNTER
Patient called request refill Cardura 4 mg tablet nightly. Refill sent to [facility] per patient.

## 2025-06-30 RX ORDER — SPIRONOLACTONE 25 MG/1
25 TABLET ORAL DAILY
Qty: 90 TABLET | Refills: 0 | Status: SHIPPED | OUTPATIENT
Start: 2025-06-30 | End: 2025-07-02 | Stop reason: SDUPTHER

## 2025-07-02 RX ORDER — SPIRONOLACTONE 25 MG/1
25 TABLET ORAL DAILY
Qty: 90 TABLET | Refills: 0 | Status: SHIPPED | OUTPATIENT
Start: 2025-07-02

## 2025-07-16 RX ORDER — DOXAZOSIN 4 MG/1
4 TABLET ORAL NIGHTLY
Qty: 90 TABLET | Refills: 0 | Status: SHIPPED | OUTPATIENT
Start: 2025-07-16